# Patient Record
Sex: MALE | NOT HISPANIC OR LATINO | URBAN - METROPOLITAN AREA
[De-identification: names, ages, dates, MRNs, and addresses within clinical notes are randomized per-mention and may not be internally consistent; named-entity substitution may affect disease eponyms.]

---

## 2022-01-31 ENCOUNTER — OFFICE VISIT (OUTPATIENT)
Dept: URBAN - METROPOLITAN AREA CLINIC 75 | Facility: CLINIC | Age: 72
End: 2022-01-31
Payer: MEDICARE

## 2022-01-31 DIAGNOSIS — H52.4 PRESBYOPIA: ICD-10-CM

## 2022-01-31 DIAGNOSIS — H43.813 VITREOUS DEGENERATION, BILATERAL: ICD-10-CM

## 2022-01-31 DIAGNOSIS — H26.493 OTHER SECONDARY CATARACT, BILATERAL: ICD-10-CM

## 2022-01-31 DIAGNOSIS — H47.323 DRUSEN OF OPTIC DISC, BILATERAL: Primary | ICD-10-CM

## 2022-01-31 PROCEDURE — 99204 OFFICE O/P NEW MOD 45 MIN: CPT | Performed by: OPTOMETRIST

## 2022-01-31 ASSESSMENT — INTRAOCULAR PRESSURE
OS: 9
OD: 11

## 2022-01-31 NOTE — IMPRESSION/PLAN
Impression: Drusen of optic disc, bilateral: H47.323. OPTOS ordered and performed 1/31/22 Clinical exam reveals evidence of mild drusen OU. Not enough to justify dx of AMD at this time. Disc drusen are composed of small proteinaceous material that become calcified with advancing age. These deposits can be considered small tumors that develop within the optic nerve head, and may lead to an elevated disc (and therefore this condition is sometimes referred to as pseudopapilledema). Plan: Discussed drusen may be an early sign of macular degeneration and this diagnosis may be possible in future. There are no specific modalities proven to be effective to treat drusen. Avoidance of smoking or smoking cessation, a diet rich in green vegetables, and regular use of sunglasses with 100% ultraviolet light is recommended. Observe. Pt to contact office if he experiences decrease in vision, blurred vision, distortion in vision or concerning changes in vision.

## 2022-01-31 NOTE — IMPRESSION/PLAN
Impression: Presbyopia: H52.4. Breaks at 3BU RECOVERS @ 5 BU Breaks @5 REJI Recovers @ Plan: New glasses Rx were distributed today. Patient to call office with any questions or concerns.

## 2022-01-31 NOTE — IMPRESSION/PLAN
Impression: Other secondary cataract, bilateral: H26.493. Plan: Surgical treatment is required. Patient elects to have surgery. Schedule YAG OU.

## 2022-01-31 NOTE — IMPRESSION/PLAN
Impression: Vitreous degeneration, bilateral: H43.813. OPTOS ordered and performed revealing floaters OU. HX of PVD Also known as posterior vitreous detachments are a normal aging change due to the vitreous jelly pulling away from the retinal lining of the eye. They may accompany retinal tears, retinal holes, or retinal detachments, so a dilated retinal examination is important. Plan: Discussed. PVDs will usually diminish with time, but it may take several months and they rarely disappear entirely. Pt to contact office if symptoms worsen, including an increase in number of floaters, you experience flashing lights, loss of vision, or a black curtain blocking your field of vision.

## 2022-05-02 ENCOUNTER — SURGERY (OUTPATIENT)
Dept: URBAN - METROPOLITAN AREA SURGERY 44 | Facility: SURGERY | Age: 72
End: 2022-05-02
Payer: MEDICARE

## 2022-05-02 ENCOUNTER — SURGERY (OUTPATIENT)
Dept: URBAN - METROPOLITAN AREA SURGERY 45 | Facility: SURGERY | Age: 72
End: 2022-05-02
Payer: MEDICARE

## 2022-05-02 PROCEDURE — 66821 AFTER CATARACT LASER SURGERY: CPT | Performed by: OPHTHALMOLOGY

## 2022-05-09 ENCOUNTER — SURGERY (OUTPATIENT)
Dept: URBAN - METROPOLITAN AREA SURGERY 45 | Facility: SURGERY | Age: 72
End: 2022-05-09
Payer: MEDICARE

## 2022-05-09 ENCOUNTER — SURGERY (OUTPATIENT)
Dept: URBAN - METROPOLITAN AREA SURGERY 44 | Facility: SURGERY | Age: 72
End: 2022-05-09
Payer: MEDICARE

## 2022-05-09 PROCEDURE — 66821 AFTER CATARACT LASER SURGERY: CPT | Performed by: OPHTHALMOLOGY

## 2022-05-17 ENCOUNTER — POST-OPERATIVE VISIT (OUTPATIENT)
Dept: URBAN - METROPOLITAN AREA CLINIC 71 | Facility: CLINIC | Age: 72
End: 2022-05-17
Payer: MEDICARE

## 2022-05-17 DIAGNOSIS — Z48.810 ENCOUNTER FOR SURGICAL AFTERCARE FOLLOWING SURGERY ON A SENSE ORGAN: Primary | ICD-10-CM

## 2022-05-17 PROCEDURE — 99024 POSTOP FOLLOW-UP VISIT: CPT | Performed by: OPHTHALMOLOGY

## 2022-05-17 ASSESSMENT — INTRAOCULAR PRESSURE
OS: 13
OD: 10

## 2022-05-17 NOTE — IMPRESSION/PLAN
Impression: S/P YAG Capsulotomy (Yttrium Aluminum [place]) OS - 8 Days. Healed well. Good capsulotomy OU. Vision has improved. Plan: Patient to call if any changes in vision occur. Return in 1 month to recheck the double vision, but since patient is only noticing it while watching tv at night, it is likely related to fatigue.

## 2022-06-20 ENCOUNTER — OFFICE VISIT (OUTPATIENT)
Dept: URBAN - METROPOLITAN AREA CLINIC 71 | Facility: CLINIC | Age: 72
End: 2022-06-20
Payer: MEDICARE

## 2022-06-20 DIAGNOSIS — Z96.1 PRESENCE OF INTRAOCULAR LENS: ICD-10-CM

## 2022-06-20 DIAGNOSIS — H43.813 VITREOUS DEGENERATION, BILATERAL: ICD-10-CM

## 2022-06-20 DIAGNOSIS — H53.2 DIPLOPIA: Primary | ICD-10-CM

## 2022-06-20 PROCEDURE — 99212 OFFICE O/P EST SF 10 MIN: CPT | Performed by: OPHTHALMOLOGY

## 2022-06-20 ASSESSMENT — INTRAOCULAR PRESSURE
OS: 8
OD: 10

## 2022-06-20 NOTE — IMPRESSION/PLAN
Impression: Diplopia: H53.2. Occasional. 
Discussed diagnosis with patient. Plan: Recommending patient to follow up with an optometrist for a refraction with prism.

## 2022-07-20 ENCOUNTER — OFFICE VISIT (OUTPATIENT)
Dept: URBAN - METROPOLITAN AREA CLINIC 75 | Facility: CLINIC | Age: 72
End: 2022-07-20
Payer: MEDICARE

## 2022-07-20 DIAGNOSIS — H52.4 PRESBYOPIA: Primary | ICD-10-CM

## 2022-07-20 ASSESSMENT — INTRAOCULAR PRESSURE
OD: 14
OS: 13

## 2022-07-20 ASSESSMENT — VISUAL ACUITY
OD: 20/30
OS: 20/25

## 2022-12-09 ENCOUNTER — OFFICE VISIT (OUTPATIENT)
Dept: URBAN - METROPOLITAN AREA CLINIC 75 | Facility: CLINIC | Age: 72
End: 2022-12-09

## 2022-12-09 DIAGNOSIS — H52.4 PRESBYOPIA: Primary | ICD-10-CM

## 2022-12-09 PROCEDURE — V2799 MISC VISION ITEM OR SERVICE: HCPCS | Performed by: OPTOMETRIST

## 2022-12-09 PROCEDURE — 92015 DETERMINE REFRACTIVE STATE: CPT | Performed by: OPTOMETRIST

## 2022-12-09 ASSESSMENT — VISUAL ACUITY
OD: 20/30
OS: 20/25

## 2022-12-09 ASSESSMENT — INTRAOCULAR PRESSURE
OS: 11
OD: 9

## 2022-12-09 NOTE — IMPRESSION/PLAN
Impression: Presbyopia: H52.4. Plan: Rechecked glasses RX today trying different trial lenses. Too much prism was put into the glasses. Will take the prism completely out. If double vision comes back will refer to Dr. Zeferino Reyes for vision therapy instead. Pt not a good candidate for prism.

## 2023-02-20 ENCOUNTER — OFFICE VISIT (OUTPATIENT)
Dept: URBAN - METROPOLITAN AREA CLINIC 75 | Facility: CLINIC | Age: 73
End: 2023-02-20

## 2023-02-20 DIAGNOSIS — H52.4 PRESBYOPIA: Primary | ICD-10-CM

## 2023-02-20 PROCEDURE — 92015 DETERMINE REFRACTIVE STATE: CPT | Performed by: OPTOMETRIST

## 2023-02-20 ASSESSMENT — VISUAL ACUITY
OD: 20/25
OS: 20/25